# Patient Record
Sex: MALE | Race: BLACK OR AFRICAN AMERICAN
[De-identification: names, ages, dates, MRNs, and addresses within clinical notes are randomized per-mention and may not be internally consistent; named-entity substitution may affect disease eponyms.]

---

## 2023-06-29 ENCOUNTER — TRANSCRIBE ORDERS (OUTPATIENT)
Dept: OTHER | Age: 53
End: 2023-06-29

## 2023-06-29 DIAGNOSIS — L84 CALLUS OF FOOT: Primary | ICD-10-CM

## 2025-05-16 ENCOUNTER — VIRTUAL VISIT (OUTPATIENT)
Dept: FAMILY MEDICINE | Facility: CLINIC | Age: 55
End: 2025-05-16
Payer: COMMERCIAL

## 2025-05-16 DIAGNOSIS — E05.90 SUBCLINICAL HYPERTHYROIDISM: ICD-10-CM

## 2025-05-16 DIAGNOSIS — I10 HYPERTENSION GOAL BP (BLOOD PRESSURE) < 140/90: ICD-10-CM

## 2025-05-16 DIAGNOSIS — G89.4 CHRONIC PAIN SYNDROME: Primary | ICD-10-CM

## 2025-05-16 PROCEDURE — 98009 SYNCH AUDIO-ONLY NEW LOW 30: CPT | Performed by: INTERNAL MEDICINE

## 2025-05-16 RX ORDER — PREGABALIN 200 MG/1
200 CAPSULE ORAL 2 TIMES DAILY
Qty: 60 CAPSULE | Refills: 5 | Status: SHIPPED | OUTPATIENT
Start: 2025-05-16

## 2025-05-16 ASSESSMENT — PATIENT HEALTH QUESTIONNAIRE - PHQ9
SUM OF ALL RESPONSES TO PHQ QUESTIONS 1-9: 8
SUM OF ALL RESPONSES TO PHQ QUESTIONS 1-9: 8
10. IF YOU CHECKED OFF ANY PROBLEMS, HOW DIFFICULT HAVE THESE PROBLEMS MADE IT FOR YOU TO DO YOUR WORK, TAKE CARE OF THINGS AT HOME, OR GET ALONG WITH OTHER PEOPLE: SOMEWHAT DIFFICULT

## 2025-05-16 NOTE — PROGRESS NOTES
This is a 54 year old male who is being evaluated via a billable telephone  visit.      How would you like to obtain your AVS? MyChart  If the telephone  visit is dropped, the invitation should be resent by: text  Will anyone else be joining your telephone visit? No                                                         [facility], [place] INTERNAL MEDICINE NOTE    Shanta Portillo is a 54 year old male who presents to clinic today for the following health issues:    Chronic pain  -chest pain, knees (7 plates in right knee), with history of injections with gel x 3. Told that he'll need right knee arthroplasty.   -lumbar back pain    There is no problem list on file for this patient.    History reviewed. No pertinent surgical history.    Social History     Tobacco Use    Smoking status: Every Day     Current packs/day: 0.50     Types: Cigarettes    Smokeless tobacco: Never   Substance Use Topics    Alcohol use: Not on file     No family history on file.      Allergies   Allergen Reactions    Ibuprofen      No lab results found.   BP Readings from Last 3 Encounters:   No data found for BP    Wt Readings from Last 3 Encounters:   No data found for Wt         Prescription Sig Dispense Quantity Refills Last Filled Start Date End Date   calcium carb-cholecalciferol (OSCAL+ D) 500-5 mg-mcg oral TABS  Take 1 tablet by mouth daily. 90 tablet  3 04/11/2025 3:57 PM CDT 04/11/2025     omeprazole (PRILOSEC) 40 mg oral capsule DR  Take 1 capsule (40 mg) by mouth daily. 90 capsule  3 04/11/2025 3:57 PM CDT 04/11/2025     atorvastatin (LIPITOR) 40 mg oral TABS  Take 0.5 tablets (20 mg) by mouth daily. 45 tablet  3 04/11/2025 3:57 PM CDT 04/11/2025     senna (SENOKOT) 8.6 mg oral tablet  Take 1 tablet (8.6 mg) by mouth daily. 90 tablet  3 04/11/2025 3:57 PM CDT 04/11/2025     albuterol (VENTOLIN HFA;PROVENTIL HFA;PROAIR) 108 (90 BASE) mcg/act inhalation inhaler  Inhale 2 puffs every 4 hours as needed (shortness of breath).  18 g  12 04/11/2025 3:57 PM CDT 04/11/2025     tamsulosin (FLOMAX) 0.4 mg oral capsule  Take 1 capsule (0.4 mg) by mouth daily after meal.Take 30 minutes after same meal each day. Do NOT crush or chew. 90 capsule  3 04/11/2025 3:57 PM CDT 04/11/2025     metoprolol succinate (TOPROL XL) 50 mg oral XL tablet  Take 1 tablet (50 mg) by mouth daily. 90 tablet  3 04/11/2025 3:57 PM CDT 04/11/2025     lisinopril-hydroCHLOROthiazide 20-12.5 mg (ZESTORETIC) 20-12.5 MG oral tablet  Take 1 tablet by mouth daily. 90 tablet  3 04/11/2025 3:57 PM CDT 04/11/2025     meloxicam (MOBIC) 15 mg oral TABS  Take 1 tablet (15 mg) by mouth daily. 90 tablet  3 04/11/2025 3:57 PM CDT 04/11/2025     DULoxetine (CYMBALTA) 30 mg oral capsule  Take 1 capsule (30 mg) by mouth daily. 90 capsule  3 04/11/2025 3:57 PM CDT 04/11/2025        ROS:  C: NEGATIVE for fever, chills, change in weight  I: NEGATIVE for worrisome rashes, moles or lesions  E: NEGATIVE for vision changes or irritation  E/M: NEGATIVE for ear, mouth and throat problems  R: NEGATIVE for significant cough or SOB  B: NEGATIVE for masses, tenderness or discharge  CV: NEGATIVE for chest pain, palpitations or peripheral edema  GI: NEGATIVE for nausea, abdominal pain, heartburn, or change in bowel habits  : NEGATIVE for frequency, dysuria, or hematuria  M: NEGATIVE for significant arthralgias or myalgia  N: NEGATIVE for weakness, dizziness or paresthesias  E: NEGATIVE for temperature intolerance, skin/hair changes  H: NEGATIVE for bleeding problems  P: NEGATIVE for changes in mood or affect    OBJECTIVE:                                                    Vitals - Patient Reported  Weight (Patient Reported): 93 kg (205 lb)  Pain Score: Moderate Pain (6)  Pain Loc: Low Back  Physical exam not done due to nature of visit.      DIAGNOSTICS                                                      XR KNEE RT 4 V AP/OBL/LAT/ZOIE*  Order: [id number]  Impression    Impression: Stable postsurgical  changes.        Reading Radiologist: Tobi Bach  Narrative    Indication: eval hardware placement.      Comparison: 09/01/2021    Findings: Stable post surgical changes of ORIF of proximal tibial fracture. Fracture has healed. Hardware is intact. Alignment is stable.  Exam End: 04/15/22 12:52 PM    Specimen Collected: 04/15/22  1:18 PM Last Resulted: 04/15/22  1:18 PM   Received From: [facility]  Result Received: 05/16/25  1:04 PM         ASSESSMENT/PLAN:                                                      Chronic pain syndrome  - REVIEW OF HEALTH MAINTENANCE PROTOCOL ORDERS  - Pregabalin (LYRICA) 200 MG capsule; Take 1 capsule (200 mg) by mouth 2 times daily.    Subclinical hyperthyroidism  - TSH; Future  - T4, free; Future      Disposition:  Follow-up in 4 weeks.

## 2025-06-16 ENCOUNTER — VIRTUAL VISIT (OUTPATIENT)
Dept: PSYCHOLOGY | Facility: CLINIC | Age: 55
End: 2025-06-16
Payer: COMMERCIAL

## 2025-06-16 DIAGNOSIS — F34.1 PERSISTENT DEPRESSIVE DISORDER: Primary | ICD-10-CM

## 2025-06-16 DIAGNOSIS — F10.10 ALCOHOL USE DISORDER, MILD, ABUSE: ICD-10-CM

## 2025-06-16 DIAGNOSIS — F41.1 GENERALIZED ANXIETY DISORDER: ICD-10-CM

## 2025-06-16 DIAGNOSIS — F43.10 POSTTRAUMATIC STRESS DISORDER: ICD-10-CM

## 2025-06-16 ASSESSMENT — ANXIETY QUESTIONNAIRES
4. TROUBLE RELAXING: MORE THAN HALF THE DAYS
IF YOU CHECKED OFF ANY PROBLEMS ON THIS QUESTIONNAIRE, HOW DIFFICULT HAVE THESE PROBLEMS MADE IT FOR YOU TO DO YOUR WORK, TAKE CARE OF THINGS AT HOME, OR GET ALONG WITH OTHER PEOPLE: VERY DIFFICULT
5. BEING SO RESTLESS THAT IT IS HARD TO SIT STILL: SEVERAL DAYS
7. FEELING AFRAID AS IF SOMETHING AWFUL MIGHT HAPPEN: MORE THAN HALF THE DAYS
8. IF YOU CHECKED OFF ANY PROBLEMS, HOW DIFFICULT HAVE THESE MADE IT FOR YOU TO DO YOUR WORK, TAKE CARE OF THINGS AT HOME, OR GET ALONG WITH OTHER PEOPLE?: VERY DIFFICULT
GAD7 TOTAL SCORE: 13
GAD7 TOTAL SCORE: 13
2. NOT BEING ABLE TO STOP OR CONTROL WORRYING: MORE THAN HALF THE DAYS
3. WORRYING TOO MUCH ABOUT DIFFERENT THINGS: NEARLY EVERY DAY
6. BECOMING EASILY ANNOYED OR IRRITABLE: SEVERAL DAYS
7. FEELING AFRAID AS IF SOMETHING AWFUL MIGHT HAPPEN: MORE THAN HALF THE DAYS
1. FEELING NERVOUS, ANXIOUS, OR ON EDGE: MORE THAN HALF THE DAYS
GAD7 TOTAL SCORE: 13

## 2025-06-16 ASSESSMENT — PATIENT HEALTH QUESTIONNAIRE - PHQ9
10. IF YOU CHECKED OFF ANY PROBLEMS, HOW DIFFICULT HAVE THESE PROBLEMS MADE IT FOR YOU TO DO YOUR WORK, TAKE CARE OF THINGS AT HOME, OR GET ALONG WITH OTHER PEOPLE: VERY DIFFICULT
SUM OF ALL RESPONSES TO PHQ QUESTIONS 1-9: 17
SUM OF ALL RESPONSES TO PHQ QUESTIONS 1-9: 17

## 2025-06-16 NOTE — PROGRESS NOTES
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 Plan for Safety and Risk Management:   Safety and Risk: Recommended that patient call 911 or go to the local ED should there be a change in any of these risk factors        Report to child / adult protection services was NA.     2. Patient's identified mental health, physical health, and substance use concerns with a cultural influence will be addressed by individual therapy.     3. Initial Treatment will focus on:    Depressed Mood - Reduce depression symptoms in the next three months  Anxiety - Reduce anxiety symptoms in the next three months.     4. Resources/Service Plan:    services are not indicated.   Modifications to assist communication are not indicated.   Additional disability accommodations are not indicated.      5. Collaboration:   Collaboration / coordination of treatment will be initiated with the following  support professionals: N/A.      6.  Referrals:   The following referral(s) will be initiated: N/A.       A Release of Information has been obtained for the following: N/A.     Clinical Substantiation/medical necessity for the above recommendations:  N/A.    7. ADELA:    ADELA:  Discussed the general  effects of drugs and alcohol on health and well-being. Provider gave patient printed information about the  effects of chemical use on their health and well being. Recommendations:  Abstain from mood altering substances that aren't prescribed by primary care physician .     8. Records:   These were not available for review at time of assessment.   Information in this assessment was obtained from the medical record and  provided by patient who is a good historian.    Patient will have open access to their mental health medical record.    9.   Interactive Complexity: No    10. Safety Plan: No Safety plan indicated    Provider Name/ Credentials:  Ramon Vega [facility]   June 16, 2025      Answers submitted by the patient for this visit:  Patient Health Questionnaire (Submitted on 6/16/2025)  If you checked off any problems, how difficult have these problems made it for you to do your work, take care of things at home, or get along with other people?: Very difficult  PHQ9 TOTAL SCORE: 17  Patient Health Questionnaire (G7) (Submitted on 6/16/2025)  RICARDA 7 TOTAL SCORE: 13

## 2025-06-23 ENCOUNTER — VIRTUAL VISIT (OUTPATIENT)
Dept: PSYCHOLOGY | Facility: CLINIC | Age: 55
End: 2025-06-23
Payer: COMMERCIAL

## 2025-06-23 DIAGNOSIS — F41.1 GENERALIZED ANXIETY DISORDER: ICD-10-CM

## 2025-06-23 DIAGNOSIS — F34.1 PERSISTENT DEPRESSIVE DISORDER: ICD-10-CM

## 2025-06-23 DIAGNOSIS — F43.10 POSTTRAUMATIC STRESS DISORDER: ICD-10-CM

## 2025-06-23 DIAGNOSIS — F10.10 ALCOHOL USE DISORDER, MILD, ABUSE: Primary | ICD-10-CM

## 2025-06-23 NOTE — PROGRESS NOTES
"    [facility]                                     Progress Note    Patient Name: Shanta Portillo  Date: 2025          Service Type: Individual      Session Start Time: 11:00  Session End Time: :     Session Length: 26 minutes    Session #: 1    Attendees: Client attended alone    Service Modality:  Phone Visit:      Provider verified identity through the following two step process.  Patient provided:  Patient  and Patient is known previously to provider    Telephone Visit: The patient's condition can be safely assessed and treated via synchronous audio telemedicine encounter.      Reason for Audio Telemedicine Visit: Patient convenience (e.g. access to timely appointments / distance to available provider)    Originating Site (Patient Location): Patient's home    Distant Site (Provider Location): Provider Remote Setting- Home Office    Telephone visit completed due to the patient did not have access to video, while the distant provider did.    Consent:  The patient/guardian has verbally consented to:     1. The potential risks and benefits of telemedicine (telephone visit) versus in person care;    The patient has been notified of the following:      \"We have found that certain health care needs can be provided without the need for a face to face visit.  This service lets us provide the care you need with a phone conversation.       I will have full access to your [facility] medical record during this entire phone call.  I will be taking notes for your medical record.      Since this is like an office visit, we will bill your insurance company for this service.       There are potential benefits and risks of telephone visits (e.g. limits to patient confidentiality) that differ from in-person visits.?Confidentiality still applies for telephone services, and nobody will record the visit.  It is important to be in a quiet, private space that is free of distractions " "(including cell phone or other devices) during the visit.??      If during the course of the call I believe a telephone visit is not appropriate, you will not be charged for this service\"     Consent has been obtained for this service by care team member: Yes     DATA  Interactive Complexity: No  Crisis: No        Progress Since Last Session (Related to Symptoms / Goals / Homework):   Symptoms: No change      Homework: Did not complete      Episode of Care Goals: No improvement - PRECONTEMPLATION (Not seeing need for change); Intervened by educating the patient about the effects of current behavior on health.  Evoked information about reasons to continue behavior, express concern / recommendations, and explored any change talk     Current / Ongoing Stressors and Concerns:  Writer and patient processed patient's concerns about his wife. Patient was able to identify he is anxious and sad that his wife is struggling with her health. Writer and patient also processed patient's concerns about disability. Patient was able to identify he is frustrated, disappointed, and anxious that he is struggling with getting his disability claim approved.     Treatment Objective(s) Addressed in This Session:   Patient will identify three fears / thoughts that contribute to feeling anxious, identify new strategies for improving mood, learn 3 affect regulation skills and practice daily over next 3 months       Intervention:  Motivational Interviewing: Writer utilized active listen, reflective listening, empathic understanding, open ended questions and invitations, clarification & summarization, self-exploration encouragement, validation & affirmation, and non-directive guidance as the patient processed patient's concerns.  Psychodynamic: Writer utilized interpretation with patient. Writer provided insights and analysis into the unconscious patterns, defense mechanisms, and relational dynamics that the patient was displaying and " reporting.  Solution Focused: Writer and patient also engaged in problem solving therapy allowing the patient to break down patient's concerns into manageable steps regarding patient's responsibilities, developing coping skills, and relationships.    Assessments completed prior to visit:  The following assessments were completed by patient for this visit:  PHQ9:       5/16/2025     1:06 PM 6/16/2025     4:12 PM   PHQ-9 SCORE   PHQ-9 Total Score MyChart 8 (Mild depression) 17 (Moderately severe depression)   PHQ-9 Total Score 8  17        Proxy-reported     GAD7:       6/16/2025     4:13 PM   RICARDA-7 SCORE   Total Score 13 (moderate anxiety)   Total Score 13        Proxy-reported     CAGE-AID:       6/16/2025     4:14 PM   CAGE-AID Total Score   Total Score 0    Total Score MyChart 0 (A total score of 2 or greater is considered clinically significant)       Proxy-reported     PROMIS 10-Global Health (all questions and answers displayed):       6/16/2025     4:14 PM   PROMIS 10   In general, would you say your health is: Poor   In general, would you say your quality of life is: Good   In general, how would you rate your physical health? Fair   In general, how would you rate your mental health, including your mood and your ability to think? Fair   In general, how would you rate your satisfaction with your social activities and relationships? Good   In general, please rate how well you carry out your usual social activities and roles Good   To what extent are you able to carry out your everyday physical activities such as walking, climbing stairs, carrying groceries, or moving a chair? Not at all   In the past 7 days, how often have you been bothered by emotional problems such as feeling anxious, depressed, or irritable? Always   In the past 7 days, how would you rate your fatigue on average? Mild   In the past 7 days, how would you rate your pain on average, where 0 means no pain, and 10 means worst imaginable pain? 8    In general, would you say your health is: 1    In general, would you say your quality of life is: 3    In general, how would you rate your physical health? 2    In general, how would you rate your mental health, including your mood and your ability to think? 2    In general, how would you rate your satisfaction with your social activities and relationships? 3    In general, please rate how well you carry out your usual social activities and roles. (This includes activities at home, at work and in your community, and responsibilities as a parent, child, spouse, employee, friend, etc.) 3    To what extent are you able to carry out your everyday physical activities such as walking, climbing stairs, carrying groceries, or moving a chair? 1    In the past 7 days, how often have you been bothered by emotional problems such as feeling anxious, depressed, or irritable? 5    In the past 7 days, how would you rate your fatigue on average? 2    In the past 7 days, how would you rate your pain on average, where 0 means no pain, and 10 means worst imaginable pain? 8    Global Mental Health Score 9    Global Physical Health Score 9    PROMIS TOTAL - SUBSCORES 18        Proxy-reported     Porter Suicide Severity Rating Scale (Lifetime/Recent)      6/16/2025     4:10 PM   Porter Suicide Severity Rating (Lifetime/Recent)   1. Wish to be Dead (Past 1 Month) N   2. Non-Specific Active Suicidal Thoughts (Lifetime) N   Deterrents (Lifetime) 0   Deterrents (Past 1 Month) 0   Reasons for Ideation (Lifetime) 0   Reasons for Ideation (Past 1 Month) 0   Actual Attempt (Lifetime) N   Has subject engaged in non-suicidal self-injurious behavior? (Lifetime) N   Interrupted Attempts (Lifetime) N   Aborted or Self-Interrupted Attempt (Lifetime) N   Preparatory Acts or Behavior (Lifetime) N   Calculated C-SSRS Risk Score (Lifetime/Recent) No Risk Indicated         ASSESSMENT: Current Emotional / Mental Status (status of significant  symptoms):   Risk status (Self / Other harm or suicidal ideation)   Patient denies current fears or concerns for personal safety.   Patient denies current or recent suicidal ideation or behaviors.   Patient denies current or recent homicidal ideation or behaviors.   Patient denies current or recent self injurious behavior or ideation.   Patient denies other safety concerns.   Patient reports there has been no change in risk factors since their last session.     Patient reports there has been no change in protective factors since their last session.     Recommended that patient call 911 or go to the local ED should there be a change in any of these risk factors     Appearance:   Unable to assess due to phone appointment   Eye Contact:   Unable to assess due to phone appointment    Psychomotor Behavior: Unable to assess due to phone appointment    Attitude:   Cooperative    Orientation:   Unable to assess due to phone appointment    Speech    Rate / Production: Normal/ Responsive    Volume:  Normal    Mood:    Angry  Anxious  Depressed  Sad    Affect:    Unable to assess due to phone appointment    Thought Content:  Clear    Thought Form:  Coherent  Logical    Insight:    Good      Medication Review:   No changes to current psychiatric medication(s)     Medication Compliance:   Yes     Changes in Health Issues:   None reported     Chemical Use Review:   Substance Use: Problem use continues with no change since last session, Stage of Change: Pre-contemplation        Tobacco Use: No current tobacco use.      Diagnosis:  1. Alcohol use disorder, mild, abuse    2. Posttraumatic stress disorder    3. Generalized anxiety disorder    4. Persistent depressive disorder        Collateral Reports Completed:   Not Applicable    PLAN: (Patient Tasks / Therapist Tasks / Other)  Patient will identify three fears / thoughts that contribute to feeling anxious, identify new strategies for improving mood, learn 3 affect regulation  skills and practice daily over next 3 months        Ramon Vega, LPCC                                                         ______________________________________________________________________    Individual Treatment Plan    Patient's Name: Shanta Portillo  YOB: 1970    Date of Creation: June 23, 2025   Date Treatment Plan Last Reviewed/Revised: June 23, 2025     DSM5 Diagnoses: 300.4 (F34.1) Persistent Depressive Disorder, Early onset, With persistent major depressive episode, and Moderate, 300.02 (F41.1) Generalized Anxiety Disorder, 309.81 (F43.10) Posttraumatic Stress Disorder (includes Posttraumatic Stress Disorder for Children 6 Years and Younger)  Without dissociative symptoms, or Substance-Related & Addictive Disorders Alcohol Use Disorder   305.00 (F10.10) Mild    Psychosocial / Contextual Factors: Societal Factors in recovery, Economic Factors low economic status, and Health- Seeking Factors applying for disability   PROMIS (reviewed every 90 days):   PROMIS TOTAL - SUBSCORES 18        Referral / Collaboration:  Referral to another professional/service is not indicated at this time..    Anticipated number of session for this episode of care: 9-12 sessions  Anticipation frequency of session: Weekly  Anticipated Duration of each session: 53 or more minutes  Treatment plan will be reviewed in 90 days or when goals have been changed.       MeasurableTreatment Goal(s) related to diagnosis / functional impairment(s)  Goal 1: Patient will Reduce overall frequency, intensity, and duration of the symptoms of depression so that daily functioning will improve in the next three months measured in a reduction of the PHQ-9 score from 17 to 8.    I will know I've met my goal when I am manage my depression.      Objective #A (Patient Action)    Patient will Decrease frequency and intensity of feeling down, depressed, hopeless.   Status: New - Date: June 23, 2025      Intervention(s)  Therapist  will teach the client how to perform a behavioral chain analysis.  .    Objective #B  Patient will Identify negative self-talk and behaviors: challenge core beliefs, myths, and actions.  Status: New - Date: June 23, 2025      Intervention(s)  Therapist will teach Opposite Action strategy.    Objective #C  Patient will identify new strategies for improving mood.  Status: New - Date: June 23, 2025      Intervention(s)  Therapist will assign homework to log daily activities and mood using log resources provided.      Goal 2: Patient will Reduce overall frequency, intensity, and duration of the symptoms of anxiety so that daily functioning will improve in the next three months measured in a reduction of the RICARDA-7 score from 13 to 6.    I will know I've met my goal when I am not stopped by stress.      Objective #A (Patient Action)    Status: New - Date: June 23, 2025      Patient will identify   fears / thoughts that contribute to feeling anxious.    Intervention(s)  Therapist will teach emotional recognition/identification.  .    Objective #B  Patient will use thought-stopping strategy daily to reduce intrusive thoughts.    Status: New - Date: June 23, 2025      Intervention(s)  Therapist will teach emotional regulation skills.  .    Objective #C  Patient will use cognitive strategies identified in therapy to challenge anxious thoughts.  Status: New - Date: June 23, 2025      Intervention(s)  Therapist will teach CBT strategies.      Goal 3: Patiient will Reduce the negative that impact trauma related symptoms have on his social, occupational, and family functioning in the next three months measured by patient's report on symptoms and social, occupational, and family functioning .     I will know I've met my goal when my past no longer stops me.      Objective #A (Patient Action)    Status: New - Date: June 23, 2025      Patient will learn 3 affect regulation skills and practice daily over next 3  months.    Intervention(s)  Therapist will provide psychoeducation around PTSD symptomology and/or the effects of traumatic stress  provide psychoeducation around trauma and memory.    Objective #B  Patient will reduce intrusive thoughts/images to 25% of days over next 3 months .    Status: New - Date: June 23, 2025      Intervention(s)  Therapist will teach at least three affect regulation strategies.    Objective #C  Patient will focus on consistent sleep, eating, movement habits to support regulation.  Status: New - Date: June 23, 2025      Intervention(s)  Therapist will teach CBT strategies.      Patient has reviewed and agreed to the above plan.      Ramon Vega, [facility]  June 23, 2025

## 2025-07-01 ENCOUNTER — VIRTUAL VISIT (OUTPATIENT)
Dept: PSYCHOLOGY | Facility: CLINIC | Age: 55
End: 2025-07-01
Payer: COMMERCIAL

## 2025-07-01 DIAGNOSIS — F43.10 POSTTRAUMATIC STRESS DISORDER: ICD-10-CM

## 2025-07-01 DIAGNOSIS — F41.1 GENERALIZED ANXIETY DISORDER: ICD-10-CM

## 2025-07-01 DIAGNOSIS — F10.10 ALCOHOL USE DISORDER, MILD, ABUSE: ICD-10-CM

## 2025-07-01 DIAGNOSIS — F34.1 PERSISTENT DEPRESSIVE DISORDER: Primary | ICD-10-CM

## 2025-07-01 PROCEDURE — 90832 PSYTX W PT 30 MINUTES: CPT | Mod: 93

## 2025-07-01 NOTE — PROGRESS NOTES
"    [facility]                                     Progress Note    Patient Name: Shanta Portillo  Date: 2025          Service Type: Individual      Session Start Time: 12:01  Session End Time: 12:32     Session Length: 31 minutes    Session #: 2    Attendees: Client attended alone    Service Modality:  Phone Visit:      Provider verified identity through the following two step process.  Patient provided:  Patient  and Patient is known previously to provider    Telephone Visit: The patient's condition can be safely assessed and treated via synchronous audio telemedicine encounter.      Reason for Audio Telemedicine Visit: Patient convenience (e.g. access to timely appointments / distance to available provider)    Originating Site (Patient Location): Patient's home    Distant Site (Provider Location): [facility] [place]    Telephone visit completed due to the patient did not have access to video, while the distant provider did.    Consent:  The patient/guardian has verbally consented to:     1. The potential risks and benefits of telemedicine (telephone visit) versus in person care;    The patient has been notified of the following:      \"We have found that certain health care needs can be provided without the need for a face to face visit.  This service lets us provide the care you need with a phone conversation.       I will have full access to your [facility] medical record during this entire phone call.  I will be taking notes for your medical record.      Since this is like an office visit, we will bill your insurance company for this service.       There are potential benefits and risks of telephone visits (e.g. limits to patient confidentiality) that differ from in-person visits.?Confidentiality still applies for telephone services, and nobody will record the visit.  It is important to be in a quiet, private space that is " "free of distractions (including cell phone or other devices) during the visit.??      If during the course of the call I believe a telephone visit is not appropriate, you will not be charged for this service\"     Consent has been obtained for this service by care team member: Yes     DATA  Interactive Complexity: No  Crisis: No        Progress Since Last Session (Related to Symptoms / Goals / Homework):   Symptoms: No change      Homework: Did not complete      Episode of Care Goals: No improvement - PRECONTEMPLATION (Not seeing need for change); Intervened by educating the patient about the effects of current behavior on health.  Evoked information about reasons to continue behavior, express concern / recommendations, and explored any change talk     Current / Ongoing Stressors and Concerns:  Writer and patient processed patient's concerns about his health. Patient was able to identify that he is frustrated, anxious, and afraid that his health has been declining and he doesn't know what to do. Writer and patient also processed patient's concerns about his finances. Patient was able to identify he is frustrated and angry that he is struggling with his finances.       Treatment Objective(s) Addressed in This Session:   Patient will use thought-stopping strategy daily to reduce intrusive thoughts, Decrease frequency and intensity of feeling down, depressed, hopeless, learn 3 affect regulation skills and practice daily over next 3 months       Intervention:  Motivational Interviewing: Writer utilized active listen, reflective listening, empathic understanding, open ended questions and invitations, clarification & summarization, self-exploration encouragement, validation & affirmation, and non-directive guidance as the patient processed patient's concerns.  Psychodynamic: Writer utilized interpretation with patient. Writer provided insights and analysis into the unconscious patterns, defense mechanisms, and relational " dynamics that the patient was displaying and reporting.  Solution Focused: Writer and patient also engaged in problem solving therapy allowing the patient to break down patient's concerns into manageable steps regarding patient's responsibilities, developing coping skills, and relationships.    Assessments completed prior to visit:  The following assessments were completed by patient for this visit:  PHQ9:       5/16/2025     1:06 PM 6/16/2025     4:12 PM   PHQ-9 SCORE   PHQ-9 Total Score MyChart 8 (Mild depression) 17 (Moderately severe depression)   PHQ-9 Total Score 8  17        Proxy-reported     GAD7:       6/16/2025     4:13 PM   RICARDA-7 SCORE   Total Score 13 (moderate anxiety)   Total Score 13        Proxy-reported     CAGE-AID:       6/16/2025     4:14 PM   CAGE-AID Total Score   Total Score 0    Total Score MyChart 0 (A total score of 2 or greater is considered clinically significant)       Proxy-reported     PROMIS 10-Global Health (all questions and answers displayed):       6/16/2025     4:14 PM   PROMIS 10   In general, would you say your health is: Poor   In general, would you say your quality of life is: Good   In general, how would you rate your physical health? Fair   In general, how would you rate your mental health, including your mood and your ability to think? Fair   In general, how would you rate your satisfaction with your social activities and relationships? Good   In general, please rate how well you carry out your usual social activities and roles Good   To what extent are you able to carry out your everyday physical activities such as walking, climbing stairs, carrying groceries, or moving a chair? Not at all   In the past 7 days, how often have you been bothered by emotional problems such as feeling anxious, depressed, or irritable? Always   In the past 7 days, how would you rate your fatigue on average? Mild   In the past 7 days, how would you rate your pain on average, where 0 means no  pain, and 10 means worst imaginable pain? 8   In general, would you say your health is: 1    In general, would you say your quality of life is: 3    In general, how would you rate your physical health? 2    In general, how would you rate your mental health, including your mood and your ability to think? 2    In general, how would you rate your satisfaction with your social activities and relationships? 3    In general, please rate how well you carry out your usual social activities and roles. (This includes activities at home, at work and in your community, and responsibilities as a parent, child, spouse, employee, friend, etc.) 3    To what extent are you able to carry out your everyday physical activities such as walking, climbing stairs, carrying groceries, or moving a chair? 1    In the past 7 days, how often have you been bothered by emotional problems such as feeling anxious, depressed, or irritable? 5    In the past 7 days, how would you rate your fatigue on average? 2    In the past 7 days, how would you rate your pain on average, where 0 means no pain, and 10 means worst imaginable pain? 8    Global Mental Health Score 9    Global Physical Health Score 9    PROMIS TOTAL - SUBSCORES 18        Proxy-reported     [place] Suicide Severity Rating Scale (Lifetime/Recent)      6/16/2025     4:10 PM   [place] Suicide Severity Rating (Lifetime/Recent)   1. Wish to be Dead (Past 1 Month) N   2. Non-Specific Active Suicidal Thoughts (Lifetime) N   Deterrents (Lifetime) 0   Deterrents (Past 1 Month) 0   Reasons for Ideation (Lifetime) 0   Reasons for Ideation (Past 1 Month) 0   Actual Attempt (Lifetime) N   Has subject engaged in non-suicidal self-injurious behavior? (Lifetime) N   Interrupted Attempts (Lifetime) N   Aborted or Self-Interrupted Attempt (Lifetime) N   Preparatory Acts or Behavior (Lifetime) N   Calculated C-SSRS Risk Score (Lifetime/Recent) No Risk Indicated         ASSESSMENT: Current Emotional /  Mental Status (status of significant symptoms):   Risk status (Self / Other harm or suicidal ideation)   Patient denies current fears or concerns for personal safety.   Patient denies current or recent suicidal ideation or behaviors.   Patient denies current or recent homicidal ideation or behaviors.   Patient denies current or recent self injurious behavior or ideation.   Patient denies other safety concerns.   Patient reports there has been no change in risk factors since their last session.     Patient reports there has been no change in protective factors since their last session.     Recommended that patient call 911 or go to the local ED should there be a change in any of these risk factors     Appearance:   Unable to assess due to phone appointment   Eye Contact:   Unable to assess due to phone appointment    Psychomotor Behavior: Unable to assess due to phone appointment    Attitude:   Cooperative    Orientation:   Unable to assess due to phone appointment    Speech    Rate / Production: Normal/ Responsive    Volume:  Normal    Mood:    Angry  Anxious  Depressed  Sad    Affect:    Unable to assess due to phone appointment    Thought Content:  Clear    Thought Form:  Coherent  Logical    Insight:    Good      Medication Review:   No changes to current psychiatric medication(s)     Medication Compliance:   Yes     Changes in Health Issues:   Yes: Cardiac issues, No Psychological Distress  Weight / dietary issues, No Psychological Distress     Chemical Use Review:   Substance Use: Problem use continues with no change since last session, Stage of Change: Pre-contemplation        Tobacco Use: No current tobacco use.      Diagnosis:  1. Persistent depressive disorder    2. Generalized anxiety disorder    3. Posttraumatic stress disorder    4. Alcohol use disorder, mild, abuse          Collateral Reports Completed:   Not Applicable    PLAN: (Patient Tasks / Therapist Tasks / Other)  Patient will use  thought-stopping strategy daily to reduce intrusive thoughts, Decrease frequency and intensity of feeling down, depressed, hopeless, learn 3 affect regulation skills and practice daily over next 3 months        Ramon Vega, [facility]                                                         ______________________________________________________________________    Individual Treatment Plan    Patient's Name: Shanta Portillo  YOB: 1970    Date of Creation: June 23, 2025   Date Treatment Plan Last Reviewed/Revised: June 23, 2025     DSM5 Diagnoses: 300.4 (F34.1) Persistent Depressive Disorder, Early onset, With persistent major depressive episode, and Moderate, 300.02 (F41.1) Generalized Anxiety Disorder, 309.81 (F43.10) Posttraumatic Stress Disorder (includes Posttraumatic Stress Disorder for Children 6 Years and Younger)  Without dissociative symptoms, or Substance-Related & Addictive Disorders Alcohol Use Disorder   305.00 (F10.10) Mild    Psychosocial / Contextual Factors: Societal Factors in recovery, Economic Factors low economic status, and Health- Seeking Factors applying for disability   PROMIS (reviewed every 90 days):   PROMIS TOTAL - SUBSCORES 18        Referral / Collaboration:  Referral to another professional/service is not indicated at this time..    Anticipated number of session for this episode of care: 9-12 sessions  Anticipation frequency of session: Weekly  Anticipated Duration of each session: 53 or more minutes  Treatment plan will be reviewed in 90 days or when goals have been changed.       MeasurableTreatment Goal(s) related to diagnosis / functional impairment(s)  Goal 1: Patient will Reduce overall frequency, intensity, and duration of the symptoms of depression so that daily functioning will improve in the next three months measured in a reduction of the PHQ-9 score from 17 to 8.    I will know I've met my goal when I am manage my depression.      Objective #A (Patient  Action)    Patient will Decrease frequency and intensity of feeling down, depressed, hopeless.   Status: New - Date: June 23, 2025      Intervention(s)  Therapist will teach the client how to perform a behavioral chain analysis.  .    Objective #B  Patient will Identify negative self-talk and behaviors: challenge core beliefs, myths, and actions.  Status: New - Date: June 23, 2025      Intervention(s)  Therapist will teach Opposite Action strategy.    Objective #C  Patient will identify new strategies for improving mood.  Status: New - Date: June 23, 2025      Intervention(s)  Therapist will assign homework to log daily activities and mood using log resources provided.      Goal 2: Patient will Reduce overall frequency, intensity, and duration of the symptoms of anxiety so that daily functioning will improve in the next three months measured in a reduction of the RICARDA-7 score from 13 to 6.    I will know I've met my goal when I am not stopped by stress.      Objective #A (Patient Action)    Status: New - Date: June 23, 2025      Patient will identify   fears / thoughts that contribute to feeling anxious.    Intervention(s)  Therapist will teach emotional recognition/identification.  .    Objective #B  Patient will use thought-stopping strategy daily to reduce intrusive thoughts.    Status: New - Date: June 23, 2025      Intervention(s)  Therapist will teach emotional regulation skills.  .    Objective #C  Patient will use cognitive strategies identified in therapy to challenge anxious thoughts.  Status: New - Date: June 23, 2025      Intervention(s)  Therapist will teach CBT strategies.      Goal 3: Patiient will Reduce the negative that impact trauma related symptoms have on his social, occupational, and family functioning in the next three months measured by patient's report on symptoms and social, occupational, and family functioning .     I will know I've met my goal when my past no longer stops me.       Objective #A (Patient Action)    Status: New - Date: June 23, 2025      Patient will learn 3 affect regulation skills and practice daily over next 3 months.    Intervention(s)  Therapist will provide psychoeducation around PTSD symptomology and/or the effects of traumatic stress  provide psychoeducation around trauma and memory.    Objective #B  Patient will reduce intrusive thoughts/images to 25% of days over next 3 months .    Status: New - Date: June 23, 2025      Intervention(s)  Therapist will teach at least three affect regulation strategies.    Objective #C  Patient will focus on consistent sleep, eating, movement habits to support regulation.  Status: New - Date: June 23, 2025      Intervention(s)  Therapist will teach CBT strategies.      Patient has reviewed and agreed to the above plan.      Ramon Vega, [facility]  June 23, 2025

## 2025-07-08 ENCOUNTER — OFFICE VISIT (OUTPATIENT)
Dept: PSYCHOLOGY | Facility: CLINIC | Age: 55
End: 2025-07-08
Payer: COMMERCIAL

## 2025-07-08 DIAGNOSIS — F34.1 PERSISTENT DEPRESSIVE DISORDER: Primary | ICD-10-CM

## 2025-07-08 DIAGNOSIS — F41.1 GENERALIZED ANXIETY DISORDER: ICD-10-CM

## 2025-07-08 DIAGNOSIS — F10.10 ALCOHOL USE DISORDER, MILD, ABUSE: ICD-10-CM

## 2025-07-08 DIAGNOSIS — F43.10 POSTTRAUMATIC STRESS DISORDER: ICD-10-CM

## 2025-07-08 PROCEDURE — 90834 PSYTX W PT 45 MINUTES: CPT

## 2025-07-08 NOTE — PROGRESS NOTES
[facility]                                     Progress Note    Patient Name: Shanta Portillo  Date: July 8, 2025          Service Type: Individual      Session Start Time: 15:44 Session End Time: 16:23     Session Length: 39  minutes    Session #: 3    Attendees: Client attended alone    Service Modality:  In person    DATA  Interactive Complexity: No  Crisis: No        Progress Since Last Session (Related to Symptoms / Goals / Homework):   Symptoms: No change      Homework: Did not complete      Episode of Care Goals: No improvement - PRECONTEMPLATION (Not seeing need for change); Intervened by educating the patient about the effects of current behavior on health.  Evoked information about reasons to continue behavior, express concern / recommendations, and explored any change talk     Current / Ongoing Stressors and Concerns:  Writer and patient processed patient's concerns about assistance. Patient was able to identify that he is frustrated, anxious, and confused about how to maintain his finances with what the [facility] requires him to do. Writer and patient also processed patient's concerns about his relationship. Patient was able to identify that he is frustrated, anxious, and angry that he and his wife continue to argue with each other.     Treatment Objective(s) Addressed in This Session:   Patient will use cognitive strategies identified in therapy to challenge anxious thoughts, Decrease frequency and intensity of feeling down, depressed, hopeless, learn 3 affect regulation skills and practice daily over next 3 months       Intervention:  Motivational Interviewing: Writer utilized active listen, reflective listening, empathic understanding, open ended questions and invitations, clarification & summarization, self-exploration encouragement, validation & affirmation, and non-directive guidance as the patient processed patient's concerns.  Psychodynamic: Writer utilized interpretation  with patient. Writer provided insights and analysis into the unconscious patterns, defense mechanisms, and relational dynamics that the patient was displaying and reporting.  Solution Focused: Writer and patient also engaged in problem solving therapy allowing the patient to break down patient's concerns into manageable steps regarding patient's responsibilities, developing coping skills, and relationships.    Assessments completed prior to visit:  The following assessments were completed by patient for this visit:  PHQ9:       5/16/2025     1:06 PM 6/16/2025     4:12 PM   PHQ-9 SCORE   PHQ-9 Total Score MyChart 8 (Mild depression) 17 (Moderately severe depression)   PHQ-9 Total Score 8  17        Proxy-reported     GAD7:       6/16/2025     4:13 PM   RICARDA-7 SCORE   Total Score 13 (moderate anxiety)   Total Score 13        Proxy-reported     CAGE-AID:       6/16/2025     4:14 PM   CAGE-AID Total Score   Total Score 0    Total Score MyChart 0 (A total score of 2 or greater is considered clinically significant)       Proxy-reported     PROMIS 10-Global Health (all questions and answers displayed):       6/16/2025     4:14 PM   PROMIS 10   In general, would you say your health is: Poor   In general, would you say your quality of life is: Good   In general, how would you rate your physical health? Fair   In general, how would you rate your mental health, including your mood and your ability to think? Fair   In general, how would you rate your satisfaction with your social activities and relationships? Good   In general, please rate how well you carry out your usual social activities and roles Good   To what extent are you able to carry out your everyday physical activities such as walking, climbing stairs, carrying groceries, or moving a chair? Not at all   In the past 7 days, how often have you been bothered by emotional problems such as feeling anxious, depressed, or irritable? Always   In the past 7 days, how would  you rate your fatigue on average? Mild   In the past 7 days, how would you rate your pain on average, where 0 means no pain, and 10 means worst imaginable pain? 8   In general, would you say your health is: 1    In general, would you say your quality of life is: 3    In general, how would you rate your physical health? 2    In general, how would you rate your mental health, including your mood and your ability to think? 2    In general, how would you rate your satisfaction with your social activities and relationships? 3    In general, please rate how well you carry out your usual social activities and roles. (This includes activities at home, at work and in your community, and responsibilities as a parent, child, spouse, employee, friend, etc.) 3    To what extent are you able to carry out your everyday physical activities such as walking, climbing stairs, carrying groceries, or moving a chair? 1    In the past 7 days, how often have you been bothered by emotional problems such as feeling anxious, depressed, or irritable? 5    In the past 7 days, how would you rate your fatigue on average? 2    In the past 7 days, how would you rate your pain on average, where 0 means no pain, and 10 means worst imaginable pain? 8    Global Mental Health Score 9    Global Physical Health Score 9    PROMIS TOTAL - SUBSCORES 18        Proxy-reported     Hagerman Suicide Severity Rating Scale (Lifetime/Recent)      6/16/2025     4:10 PM   Hagerman Suicide Severity Rating (Lifetime/Recent)   1. Wish to be Dead (Past 1 Month) N   2. Non-Specific Active Suicidal Thoughts (Lifetime) N   Deterrents (Lifetime) 0   Deterrents (Past 1 Month) 0   Reasons for Ideation (Lifetime) 0   Reasons for Ideation (Past 1 Month) 0   Actual Attempt (Lifetime) N   Has subject engaged in non-suicidal self-injurious behavior? (Lifetime) N   Interrupted Attempts (Lifetime) N   Aborted or Self-Interrupted Attempt (Lifetime) N   Preparatory Acts or Behavior  (Lifetime) N   Calculated C-SSRS Risk Score (Lifetime/Recent) No Risk Indicated         ASSESSMENT: Current Emotional / Mental Status (status of significant symptoms):   Risk status (Self / Other harm or suicidal ideation)   Patient denies current fears or concerns for personal safety.   Patient denies current or recent suicidal ideation or behaviors.   Patient denies current or recent homicidal ideation or behaviors.   Patient denies current or recent self injurious behavior or ideation.   Patient denies other safety concerns.   Patient reports there has been no change in risk factors since their last session.     Patient reports there has been no change in protective factors since their last session.     Recommended that patient call 911 or go to the local ED should there be a change in any of these risk factors     Appearance:   Appropriate    Eye Contact:   Good    Psychomotor Behavior: Agitated    Attitude:   Cooperative    Orientation:   All   Speech    Rate / Production: Normal/ Responsive    Volume:  Normal    Mood:    Angry  Anxious  Depressed  Sad    Affect:    Appropriate    Thought Content:  Clear    Thought Form:  Coherent  Logical    Insight:    Good      Medication Review:   No changes to current psychiatric medication(s)     Medication Compliance:   Yes     Changes in Health Issues:   None reported     Chemical Use Review:   Substance Use: Problem use continues with no change since last session, Stage of Change: Relapse        Tobacco Use: No current tobacco use.      Diagnosis:  1. Persistent depressive disorder    2. Generalized anxiety disorder    3. Posttraumatic stress disorder    4. Alcohol use disorder, mild, abuse            Collateral Reports Completed:   Not Applicable    PLAN: (Patient Tasks / Therapist Tasks / Other)  Patient will use cognitive strategies identified in therapy to challenge anxious thoughts, Decrease frequency and intensity of feeling down, depressed, hopeless, learn 3  affect regulation skills and practice daily over next 3 months        Ramon Vega, LPCC                                                         ______________________________________________________________________    Individual Treatment Plan    Patient's Name: Shanta Portillo  YOB: 1970    Date of Creation: June 23, 2025   Date Treatment Plan Last Reviewed/Revised: June 23, 2025     DSM5 Diagnoses: 300.4 (F34.1) Persistent Depressive Disorder, Early onset, With persistent major depressive episode, and Moderate, 300.02 (F41.1) Generalized Anxiety Disorder, 309.81 (F43.10) Posttraumatic Stress Disorder (includes Posttraumatic Stress Disorder for Children 6 Years and Younger)  Without dissociative symptoms, or Substance-Related & Addictive Disorders Alcohol Use Disorder   305.00 (F10.10) Mild    Psychosocial / Contextual Factors: Societal Factors in recovery, Economic Factors low economic status, and Health- Seeking Factors applying for disability   PROMIS (reviewed every 90 days):   PROMIS TOTAL - SUBSCORES 18        Referral / Collaboration:  Referral to another professional/service is not indicated at this time..    Anticipated number of session for this episode of care: 9-12 sessions  Anticipation frequency of session: Weekly  Anticipated Duration of each session: 53 or more minutes  Treatment plan will be reviewed in 90 days or when goals have been changed.       MeasurableTreatment Goal(s) related to diagnosis / functional impairment(s)  Goal 1: Patient will Reduce overall frequency, intensity, and duration of the symptoms of depression so that daily functioning will improve in the next three months measured in a reduction of the PHQ-9 score from 17 to 8.    I will know I've met my goal when I am manage my depression.      Objective #A (Patient Action)    Patient will Decrease frequency and intensity of feeling down, depressed, hopeless.   Status: New - Date: June 23, 2025       Intervention(s)  Therapist will teach the client how to perform a behavioral chain analysis.  .    Objective #B  Patient will Identify negative self-talk and behaviors: challenge core beliefs, myths, and actions.  Status: New - Date: June 23, 2025      Intervention(s)  Therapist will teach Opposite Action strategy.    Objective #C  Patient will identify new strategies for improving mood.  Status: New - Date: June 23, 2025      Intervention(s)  Therapist will assign homework to log daily activities and mood using log resources provided.      Goal 2: Patient will Reduce overall frequency, intensity, and duration of the symptoms of anxiety so that daily functioning will improve in the next three months measured in a reduction of the RICARDA-7 score from 13 to 6.    I will know I've met my goal when I am not stopped by stress.      Objective #A (Patient Action)    Status: New - Date: June 23, 2025      Patient will identify   fears / thoughts that contribute to feeling anxious.    Intervention(s)  Therapist will teach emotional recognition/identification.  .    Objective #B  Patient will use thought-stopping strategy daily to reduce intrusive thoughts.    Status: New - Date: June 23, 2025      Intervention(s)  Therapist will teach emotional regulation skills.  .    Objective #C  Patient will use cognitive strategies identified in therapy to challenge anxious thoughts.  Status: New - Date: June 23, 2025      Intervention(s)  Therapist will teach CBT strategies.      Goal 3: Patiient will Reduce the negative that impact trauma related symptoms have on his social, occupational, and family functioning in the next three months measured by patient's report on symptoms and social, occupational, and family functioning .     I will know I've met my goal when my past no longer stops me.      Objective #A (Patient Action)    Status: New - Date: June 23, 2025      Patient will learn 3 affect regulation skills and practice daily  over next 3 months.    Intervention(s)  Therapist will provide psychoeducation around PTSD symptomology and/or the effects of traumatic stress  provide psychoeducation around trauma and memory.    Objective #B  Patient will reduce intrusive thoughts/images to 25% of days over next 3 months .    Status: New - Date: June 23, 2025      Intervention(s)  Therapist will teach at least three affect regulation strategies.    Objective #C  Patient will focus on consistent sleep, eating, movement habits to support regulation.  Status: New - Date: June 23, 2025      Intervention(s)  Therapist will teach CBT strategies.      Patient has reviewed and agreed to the above plan.      Ramon Vega, [facility]  June 23, 2025

## 2025-07-16 ENCOUNTER — VIRTUAL VISIT (OUTPATIENT)
Dept: PSYCHOLOGY | Facility: CLINIC | Age: 55
End: 2025-07-16
Payer: COMMERCIAL

## 2025-07-16 DIAGNOSIS — F43.10 POSTTRAUMATIC STRESS DISORDER: ICD-10-CM

## 2025-07-16 DIAGNOSIS — F34.1 PERSISTENT DEPRESSIVE DISORDER: Primary | ICD-10-CM

## 2025-07-16 DIAGNOSIS — F41.1 GENERALIZED ANXIETY DISORDER: ICD-10-CM

## 2025-07-16 DIAGNOSIS — F10.10 ALCOHOL USE DISORDER, MILD, ABUSE: ICD-10-CM

## 2025-07-16 PROCEDURE — 90834 PSYTX W PT 45 MINUTES: CPT | Mod: 95

## 2025-07-16 NOTE — PROGRESS NOTES
[facility]                                     Progress Note    Patient Name: Shanta Portillo  Date: 2025          Service Type: Individual      Session Start Time: 15:58 Session End Time: 16:38     Session Length: 40 minutes    Session #: 4    Attendees: Client attended alone    Service Modality: Video Visit:      Provider verified identity through the following two step process.  Patient provided:  Patient  and Patient is known previously to provider    Telemedicine Visit: The patient's condition can be safely assessed and treated via synchronous audio and visual telemedicine encounter.      Reason for Telemedicine Visit: Patient convenience (e.g. access to timely appointments / distance to available provider)    Originating Site (Patient Location): Patient's home    Distant Site (Provider Location): Provider Remote Setting- Home Office    Consent:  The patient/guardian has verbally consented to: the potential risks and benefits of telemedicine (video visit) versus in person care; bill my insurance or make self-payment for services provided; and responsibility for payment of non-covered services.     Patient would like the video invitation sent by:  My Chart    Mode of Communication:  [facility]    Distant Location (Provider):  Off-site    As the provider I attest to compliance with applicable laws and regulations related to telemedicine.    DATA  Extended Session (53+ minutes): No  Interactive Complexity: No  Crisis: No        Progress Since Last Session (Related to Symptoms / Goals / Homework):   Symptoms: No change      Homework: Did not complete      Episode of Care Goals: No improvement - PRECONTEMPLATION (Not seeing need for change); Intervened by educating the patient about the effects of current behavior on health.  Evoked information about reasons to continue behavior, express concern / recommendations, and explored any change talk     Current /  Ongoing Stressors and Concerns:  Writer and patient processed patient's concerns about his finances. Patient was able to identify that he is frustrated, anxious, and disappointed that he continues struggling with managing his finances and getting approved for disability. Writer and patient also processed patient's concerns about his health. Patient was able to identify that he is frustrated, anxious, and disappointed that he struggles with managing his health and having reliable transportation.     Treatment Objective(s) Addressed in This Session:   Patient will use cognitive strategies identified in therapy to challenge anxious thoughts, Decrease frequency and intensity of feeling down, depressed, hopeless, learn 3 affect regulation skills and practice daily over next 3 months       Intervention:  Motivational Interviewing: Writer utilized active listen, reflective listening, empathic understanding, open ended questions and invitations, clarification & summarization, self-exploration encouragement, validation & affirmation, and non-directive guidance as the patient processed patient's concerns.  Psychodynamic: Writer utilized interpretation with patient. Writer provided insights and analysis into the unconscious patterns, defense mechanisms, and relational dynamics that the patient was displaying and reporting.  Solution Focused: Writer and patient also engaged in problem solving therapy allowing the patient to break down patient's concerns into manageable steps regarding patient's responsibilities, developing coping skills, and relationships.    Assessments completed prior to visit:  The following assessments were completed by patient for this visit:  PHQ9:       5/16/2025     1:06 PM 6/16/2025     4:12 PM   PHQ-9 SCORE   PHQ-9 Total Score MyChart 8 (Mild depression) 17 (Moderately severe depression)   PHQ-9 Total Score 8  17        Proxy-reported     GAD7:       6/16/2025     4:13 PM   RICARDA-7 SCORE   Total Score  13 (moderate anxiety)   Total Score 13        Proxy-reported     CAGE-AID:       6/16/2025     4:14 PM   CAGE-AID Total Score   Total Score 0    Total Score MyChart 0 (A total score of 2 or greater is considered clinically significant)       Proxy-reported     PROMIS 10-Global Health (all questions and answers displayed):       6/16/2025     4:14 PM   PROMIS 10   In general, would you say your health is: Poor   In general, would you say your quality of life is: Good   In general, how would you rate your physical health? Fair   In general, how would you rate your mental health, including your mood and your ability to think? Fair   In general, how would you rate your satisfaction with your social activities and relationships? Good   In general, please rate how well you carry out your usual social activities and roles Good   To what extent are you able to carry out your everyday physical activities such as walking, climbing stairs, carrying groceries, or moving a chair? Not at all   In the past 7 days, how often have you been bothered by emotional problems such as feeling anxious, depressed, or irritable? Always   In the past 7 days, how would you rate your fatigue on average? Mild   In the past 7 days, how would you rate your pain on average, where 0 means no pain, and 10 means worst imaginable pain? 8   In general, would you say your health is: 1    In general, would you say your quality of life is: 3    In general, how would you rate your physical health? 2    In general, how would you rate your mental health, including your mood and your ability to think? 2    In general, how would you rate your satisfaction with your social activities and relationships? 3    In general, please rate how well you carry out your usual social activities and roles. (This includes activities at home, at work and in your community, and responsibilities as a parent, child, spouse, employee, friend, etc.) 3    To what extent are you able  to carry out your everyday physical activities such as walking, climbing stairs, carrying groceries, or moving a chair? 1    In the past 7 days, how often have you been bothered by emotional problems such as feeling anxious, depressed, or irritable? 5    In the past 7 days, how would you rate your fatigue on average? 2    In the past 7 days, how would you rate your pain on average, where 0 means no pain, and 10 means worst imaginable pain? 8    Global Mental Health Score 9    Global Physical Health Score 9    PROMIS TOTAL - SUBSCORES 18        Proxy-reported     [place] Suicide Severity Rating Scale (Lifetime/Recent)      6/16/2025     4:10 PM   [place] Suicide Severity Rating (Lifetime/Recent)   1. Wish to be Dead (Past 1 Month) N   2. Non-Specific Active Suicidal Thoughts (Lifetime) N   Deterrents (Lifetime) 0   Deterrents (Past 1 Month) 0   Reasons for Ideation (Lifetime) 0   Reasons for Ideation (Past 1 Month) 0   Actual Attempt (Lifetime) N   Has subject engaged in non-suicidal self-injurious behavior? (Lifetime) N   Interrupted Attempts (Lifetime) N   Aborted or Self-Interrupted Attempt (Lifetime) N   Preparatory Acts or Behavior (Lifetime) N   Calculated C-SSRS Risk Score (Lifetime/Recent) No Risk Indicated         ASSESSMENT: Current Emotional / Mental Status (status of significant symptoms):   Risk status (Self / Other harm or suicidal ideation)   Patient denies current fears or concerns for personal safety.   Patient denies current or recent suicidal ideation or behaviors.   Patient denies current or recent homicidal ideation or behaviors.   Patient denies current or recent self injurious behavior or ideation.   Patient denies other safety concerns.   Patient reports there has been no change in risk factors since their last session.     Patient reports there has been no change in protective factors since their last session.     Recommended that patient call 911 or go to the local ED should there be a  change in any of these risk factors     Appearance:   Appropriate    Eye Contact:   Good    Psychomotor Behavior: Agitated    Attitude:   Cooperative    Orientation:   All   Speech    Rate / Production: Normal/ Responsive    Volume:  Normal    Mood:    Angry  Anxious  Depressed  Sad    Affect:    Appropriate    Thought Content:  Clear    Thought Form:  Coherent  Logical    Insight:    Good      Medication Review:   No changes to current psychiatric medication(s)     Medication Compliance:   Yes     Changes in Health Issues:   None reported     Chemical Use Review:   Substance Use: Problem use continues with no change since last session, Stage of Change: Relapse        Tobacco Use: No current tobacco use.      Diagnosis:  1. Persistent depressive disorder    2. Posttraumatic stress disorder    3. Generalized anxiety disorder    4. Alcohol use disorder, mild, abuse              Collateral Reports Completed:   Not Applicable    PLAN: (Patient Tasks / Therapist Tasks / Other)  Patient will use cognitive strategies identified in therapy to challenge anxious thoughts, Decrease frequency and intensity of feeling down, depressed, hopeless, learn 3 affect regulation skills and practice daily over next 3 months        Ramon Vega, [facility]                                                         ______________________________________________________________________    Individual Treatment Plan    Patient's Name: Shanta Portillo  YOB: 1970    Date of Creation: June 23, 2025   Date Treatment Plan Last Reviewed/Revised: June 23, 2025     DSM5 Diagnoses: 300.4 (F34.1) Persistent Depressive Disorder, Early onset, With persistent major depressive episode, and Moderate, 300.02 (F41.1) Generalized Anxiety Disorder, 309.81 (F43.10) Posttraumatic Stress Disorder (includes Posttraumatic Stress Disorder for Children 6 Years and Younger)  Without dissociative symptoms, or Substance-Related & Addictive Disorders Alcohol  Use Disorder   305.00 (F10.10) Mild    Psychosocial / Contextual Factors: Societal Factors in recovery, Economic Factors low economic status, and Health- Seeking Factors applying for disability   PROMIS (reviewed every 90 days):   PROMIS TOTAL - SUBSCORES 18        Referral / Collaboration:  Referral to another professional/service is not indicated at this time..    Anticipated number of session for this episode of care: 9-12 sessions  Anticipation frequency of session: Weekly  Anticipated Duration of each session: 53 or more minutes  Treatment plan will be reviewed in 90 days or when goals have been changed.       MeasurableTreatment Goal(s) related to diagnosis / functional impairment(s)  Goal 1: Patient will Reduce overall frequency, intensity, and duration of the symptoms of depression so that daily functioning will improve in the next three months measured in a reduction of the PHQ-9 score from 17 to 8.    I will know I've met my goal when I am manage my depression.      Objective #A (Patient Action)    Patient will Decrease frequency and intensity of feeling down, depressed, hopeless.   Status: New - Date: June 23, 2025      Intervention(s)  Therapist will teach the client how to perform a behavioral chain analysis.  .    Objective #B  Patient will Identify negative self-talk and behaviors: challenge core beliefs, myths, and actions.  Status: New - Date: June 23, 2025      Intervention(s)  Therapist will teach Opposite Action strategy.    Objective #C  Patient will identify new strategies for improving mood.  Status: New - Date: June 23, 2025      Intervention(s)  Therapist will assign homework to log daily activities and mood using log resources provided.      Goal 2: Patient will Reduce overall frequency, intensity, and duration of the symptoms of anxiety so that daily functioning will improve in the next three months measured in a reduction of the RICARDA-7 score from 13 to 6.    I will know I've met my goal  when I am not stopped by stress.      Objective #A (Patient Action)    Status: New - Date: June 23, 2025      Patient will identify   fears / thoughts that contribute to feeling anxious.    Intervention(s)  Therapist will teach emotional recognition/identification.  .    Objective #B  Patient will use thought-stopping strategy daily to reduce intrusive thoughts.    Status: New - Date: June 23, 2025      Intervention(s)  Therapist will teach emotional regulation skills.  .    Objective #C  Patient will use cognitive strategies identified in therapy to challenge anxious thoughts.  Status: New - Date: June 23, 2025      Intervention(s)  Therapist will teach CBT strategies.      Goal 3: Patiient will Reduce the negative that impact trauma related symptoms have on his social, occupational, and family functioning in the next three months measured by patient's report on symptoms and social, occupational, and family functioning .     I will know I've met my goal when my past no longer stops me.      Objective #A (Patient Action)    Status: New - Date: June 23, 2025      Patient will learn 3 affect regulation skills and practice daily over next 3 months.    Intervention(s)  Therapist will provide psychoeducation around PTSD symptomology and/or the effects of traumatic stress  provide psychoeducation around trauma and memory.    Objective #B  Patient will reduce intrusive thoughts/images to 25% of days over next 3 months .    Status: New - Date: June 23, 2025      Intervention(s)  Therapist will teach at least three affect regulation strategies.    Objective #C  Patient will focus on consistent sleep, eating, movement habits to support regulation.  Status: New - Date: June 23, 2025      Intervention(s)  Therapist will teach CBT strategies.      Patient has reviewed and agreed to the above plan.      Ramon Vega, [facility]  June 23, 2025

## 2025-08-11 ENCOUNTER — VIRTUAL VISIT (OUTPATIENT)
Dept: PSYCHOLOGY | Facility: CLINIC | Age: 55
End: 2025-08-11
Payer: COMMERCIAL

## 2025-08-11 DIAGNOSIS — F43.10 POSTTRAUMATIC STRESS DISORDER: ICD-10-CM

## 2025-08-11 DIAGNOSIS — F34.1 PERSISTENT DEPRESSIVE DISORDER: Primary | ICD-10-CM

## 2025-08-11 DIAGNOSIS — F41.1 GENERALIZED ANXIETY DISORDER: ICD-10-CM

## 2025-08-11 DIAGNOSIS — F10.10 ALCOHOL USE DISORDER, MILD, ABUSE: ICD-10-CM

## 2025-08-11 PROCEDURE — 90834 PSYTX W PT 45 MINUTES: CPT | Mod: 95

## 2025-08-18 ENCOUNTER — VIRTUAL VISIT (OUTPATIENT)
Dept: PSYCHOLOGY | Facility: CLINIC | Age: 55
End: 2025-08-18
Payer: COMMERCIAL

## 2025-08-18 DIAGNOSIS — F34.1 PERSISTENT DEPRESSIVE DISORDER: Primary | ICD-10-CM

## 2025-08-18 DIAGNOSIS — F43.10 POSTTRAUMATIC STRESS DISORDER: ICD-10-CM

## 2025-08-18 DIAGNOSIS — F10.10 ALCOHOL USE DISORDER, MILD, ABUSE: ICD-10-CM

## 2025-08-18 DIAGNOSIS — F41.1 GENERALIZED ANXIETY DISORDER: ICD-10-CM

## 2025-08-18 PROCEDURE — 90834 PSYTX W PT 45 MINUTES: CPT | Mod: 93

## 2025-08-25 ENCOUNTER — VIRTUAL VISIT (OUTPATIENT)
Dept: PSYCHOLOGY | Facility: CLINIC | Age: 55
End: 2025-08-25
Payer: COMMERCIAL

## 2025-08-25 DIAGNOSIS — F41.1 GENERALIZED ANXIETY DISORDER: ICD-10-CM

## 2025-08-25 DIAGNOSIS — F43.10 POSTTRAUMATIC STRESS DISORDER: ICD-10-CM

## 2025-08-25 DIAGNOSIS — F10.10 ALCOHOL USE DISORDER, MILD, ABUSE: ICD-10-CM

## 2025-08-25 DIAGNOSIS — F34.1 PERSISTENT DEPRESSIVE DISORDER: Primary | ICD-10-CM

## 2025-08-25 PROCEDURE — 90832 PSYTX W PT 30 MINUTES: CPT | Mod: 93
